# Patient Record
Sex: FEMALE | Race: WHITE
[De-identification: names, ages, dates, MRNs, and addresses within clinical notes are randomized per-mention and may not be internally consistent; named-entity substitution may affect disease eponyms.]

---

## 2019-09-14 ENCOUNTER — HOSPITAL ENCOUNTER (INPATIENT)
Dept: HOSPITAL 1 - ED | Age: 69
LOS: 3 days | Discharge: LEFT BEFORE DISCHARGE | DRG: 393 | End: 2019-09-17
Attending: INTERNAL MEDICINE | Admitting: INTERNAL MEDICINE
Payer: COMMERCIAL

## 2019-09-14 VITALS — BODY MASS INDEX: 27.48 KG/M2 | WEIGHT: 140 LBS | HEIGHT: 60 IN

## 2019-09-14 VITALS — DIASTOLIC BLOOD PRESSURE: 54 MMHG | SYSTOLIC BLOOD PRESSURE: 98 MMHG

## 2019-09-14 DIAGNOSIS — K28.4: ICD-10-CM
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
## 2019-09-14 DIAGNOSIS — K95.89: Primary | ICD-10-CM

## 2019-09-14 DIAGNOSIS — Z79.01: ICD-10-CM

## 2019-09-14 DIAGNOSIS — R57.8: ICD-10-CM
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
## 2019-09-14 DIAGNOSIS — I10: ICD-10-CM

## 2019-09-14 DIAGNOSIS — I95.89: ICD-10-CM

## 2019-09-14 DIAGNOSIS — D68.32: ICD-10-CM

## 2019-09-14 DIAGNOSIS — I11.9: ICD-10-CM

## 2019-09-14 DIAGNOSIS — D62: ICD-10-CM

## 2019-09-14 DIAGNOSIS — I25.10: ICD-10-CM

## 2019-09-14 DIAGNOSIS — I48.91: ICD-10-CM

## 2019-09-14 DIAGNOSIS — Z98.84: ICD-10-CM

## 2019-09-14 LAB
ALBUMIN SERPL-MCNC: 2.8 G/DL (ref 3.4–5)
ALP SERPL-CCNC: 61 U/L (ref 46–116)
ALT SERPL-CCNC: 25 U/L (ref 14–59)
AST SERPL-CCNC: 17 U/L (ref 15–37)
BASOPHILS NFR BLD: 0.2 % (ref 0–2)
BILIRUB SERPL-MCNC: 0.6 MG/DL (ref 0.2–1)
BUN SERPL-MCNC: 33 MG/DL (ref 7–18)
CALCIUM SERPL-MCNC: 7.5 MG/DL (ref 8.5–10.1)
CHLORIDE SERPL-SCNC: 113 MMOL/L (ref 98–107)
CHOLEST SERPL-MCNC: 111 MG/DL (ref ?–200)
CO2 SERPL-SCNC: 24.5 MMOL/L (ref 21–32)
CREAT SERPL-MCNC: 0.8 MG/DL (ref 0.6–1)
ERYTHROCYTE [DISTWIDTH] IN BLOOD BY AUTOMATED COUNT: 15.1 % (ref 11.5–14.5)
GFR SERPLBLD BASED ON 1.73 SQ M-ARVRAT: (no result) ML/MIN
GLUCOSE SERPL-MCNC: 160 MG/DL (ref 74–106)
HDLC SERPL-MCNC: 36 MG/DL (ref 40–60)
MAGNESIUM SERPL-MCNC: 1.9 MG/DL (ref 1.8–2.4)
MICROSCOPIC UR-IMP: YES
PHOSPHATE SERPL-MCNC: 4.1 MG/DL (ref 2.5–4.9)
PLATELET # BLD: 207 X10^3MCL (ref 130–400)
POTASSIUM SERPL-SCNC: 4.7 MMOL/L (ref 3.5–5.1)
PROT SERPL-MCNC: 5.7 G/DL (ref 6.4–8.2)
RBC # UR STRIP.AUTO: (no result) /UL
SODIUM SERPL-SCNC: 147 MMOL/L (ref 136–145)
UA SPECIFIC GRAVITY: 1.02 (ref 1–1.03)

## 2019-09-14 PROCEDURE — C9113 INJ PANTOPRAZOLE SODIUM, VIA: HCPCS

## 2019-09-14 PROCEDURE — C9132 KCENTRA, PER I.U.: HCPCS

## 2019-09-14 PROCEDURE — P9016 RBC LEUKOCYTES REDUCED: HCPCS

## 2019-09-14 PROCEDURE — G0378 HOSPITAL OBSERVATION PER HR: HCPCS

## 2019-09-14 NOTE — NUR
Received pt alert and oriented x4. No distress noted. Kept clean and dry. All
needs attended and anticipated. Seen and examined by Dr. Oliva with new
orders. New orders noted and carried out. Admission care rendered,pt
cooperative and very polite at this time. Pt still ongoing NS 150ml/hr and
Du synephrine 50mcg/min at this time. All meds given as ordered. Will
continue to monitor. Patient tolerated procedure well.

## 2019-09-14 NOTE — NUR
DR BARTON AT BEDSIDE DICUSSING PLAN OF CARE. PT AWARE SHE WILL BE HOPSITALIZED
TO STABLIZE HER BLOOD PRESSURE AND BLEEDING.

## 2019-09-14 NOTE — NUR
INFUSING PHENYLEPHRINE DRIP AT THIS TIME. INFUSING AT 50MCG/MIN
 BP:77/54  MAP:61. WILL CONT TO MONITOR.

## 2019-09-14 NOTE — NUR
PHENYLEPHIRINE TITRATED TO 75MCG/MIN (22.5ML/HR) INFUSING NO PROB TO LAC, NO
INFILTRATION OR PAIN NOTED. BP: 84/51 MAP:63
NS BOLUS INFUSING TO RAC NO INFILTRATION OR PAIN NOTED AT IV SITE. 
 
PT IS AAOX4, NO DISTRESS NOTED, RESP E/U, SKIN INTACT. PT ON FULL CM, NSR, WILL
CONT TO MONITOR.

## 2019-09-14 NOTE — NUR
Spoke to Dr. Whitman and updated him on pt status. MD stated that he will see the
patient in the morning.

## 2019-09-14 NOTE — NUR
PER MEDIC PT WAS AT HOME TAKING A SHOWER THEN SHE USED THE RESTROOM AND NOTICED
"BRIGHT RED BLOOD". PT STS THIS HAPPENED THIS AFTERNOON. PT STS THAT SHE DOES
HAVE A HEADACHE. PT STS THAT SHE IS ALSO HAVING BACK PAIN AND SHOULDER PAIN. PT
DENIES FALLING OR INJURING HER BACK/NECK/SHOULDERS. PT IS ACTIVELY CRYING ABOUT
HER SON LOSING HIS WIFE. PT STS THAT SHE CHANGED HER MEDICATION FROM WARFARIN
TO ELIQUIS FOR ABOUT ONE YEAR. PT STS THAT SHE HAS NEVER HAD BLEEDING BEFORE.
PT STS THAT SHE HAD ABDOMINAL PAIN LAST NIGHT BUT HAD NO BLEEDING. PT IS PALE,
COOL. PT IS ALERT AND ORIENTED, SPEAKING NI CLEAR AND FULL SENTENCES. WILL
CONTINUE TO MONITOR.

## 2019-09-15 VITALS — DIASTOLIC BLOOD PRESSURE: 74 MMHG | SYSTOLIC BLOOD PRESSURE: 114 MMHG

## 2019-09-15 VITALS — SYSTOLIC BLOOD PRESSURE: 97 MMHG | DIASTOLIC BLOOD PRESSURE: 52 MMHG

## 2019-09-15 VITALS — DIASTOLIC BLOOD PRESSURE: 71 MMHG | SYSTOLIC BLOOD PRESSURE: 102 MMHG

## 2019-09-15 VITALS — SYSTOLIC BLOOD PRESSURE: 103 MMHG | DIASTOLIC BLOOD PRESSURE: 75 MMHG

## 2019-09-15 LAB
BASOPHILS NFR BLD: 0 % (ref 0–2)
BASOPHILS NFR BLD: 0 % (ref 0–2)
BASOPHILS NFR BLD: 1.1 % (ref 0–2)
BASOPHILS NFR BLD: 1.1 % (ref 0–2)
BUN SERPL-MCNC: 34 MG/DL (ref 7–18)
CALCIUM SERPL-MCNC: 7.1 MG/DL (ref 8.5–10.1)
CHLORIDE SERPL-SCNC: 115 MMOL/L (ref 98–107)
CO2 SERPL-SCNC: 22.1 MMOL/L (ref 21–32)
CREAT SERPL-MCNC: 0.7 MG/DL (ref 0.6–1)
ERYTHROCYTE [DISTWIDTH] IN BLOOD BY AUTOMATED COUNT: 15.3 % (ref 11.5–14.5)
ERYTHROCYTE [DISTWIDTH] IN BLOOD BY AUTOMATED COUNT: 15.3 % (ref 11.5–14.5)
ERYTHROCYTE [DISTWIDTH] IN BLOOD BY AUTOMATED COUNT: 15.4 % (ref 11.5–14.5)
ERYTHROCYTE [DISTWIDTH] IN BLOOD BY AUTOMATED COUNT: 15.5 % (ref 11.5–14.5)
GFR SERPLBLD BASED ON 1.73 SQ M-ARVRAT: (no result) ML/MIN
GLUCOSE SERPL-MCNC: 105 MG/DL (ref 74–106)
MONOCYTES NFR BLD: 2 % (ref 0–7)
MONOCYTES NFR BLD: 4 % (ref 0–7)
NEUTS BAND NFR BLD: 0 % (ref 0–10)
NEUTS BAND NFR BLD: 2 % (ref 0–10)
NEUTS SEG NFR BLD MANUAL: 63 % (ref 37–75)
NEUTS SEG NFR BLD MANUAL: 69 % (ref 37–75)
PLAT MORPH BLD: (no result)
PLAT MORPH BLD: (no result)
PLATELET # BLD: 151 X10^3MCL (ref 130–400)
PLATELET # BLD: 161 X10^3MCL (ref 130–400)
PLATELET # BLD: 192 X10^3MCL (ref 130–400)
PLATELET # BLD: 200 X10^3MCL (ref 130–400)
POTASSIUM SERPL-SCNC: 4.4 MMOL/L (ref 3.5–5.1)
RBC MORPH BLD: NORMAL
SODIUM SERPL-SCNC: 146 MMOL/L (ref 136–145)

## 2019-09-15 PROCEDURE — 0DB68ZX EXCISION OF STOMACH, VIA NATURAL OR ARTIFICIAL OPENING ENDOSCOPIC, DIAGNOSTIC: ICD-10-PCS | Performed by: INTERNAL MEDICINE

## 2019-09-15 PROCEDURE — 30233N1 TRANSFUSION OF NONAUTOLOGOUS RED BLOOD CELLS INTO PERIPHERAL VEIN, PERCUTANEOUS APPROACH: ICD-10-PCS | Performed by: INTERNAL MEDICINE

## 2019-09-15 PROCEDURE — 0W3P8ZZ CONTROL BLEEDING IN GASTROINTESTINAL TRACT, VIA NATURAL OR ARTIFICIAL OPENING ENDOSCOPIC: ICD-10-PCS | Performed by: INTERNAL MEDICINE

## 2019-09-15 NOTE — NUR
DR WILLIS UPDATING PTS  OF FINDINGS OF PROCEDURE AT THIS TIME. PT AND
FAMILY UPDATED ON POC. ALL QUESTIONS AND CONCERNS ADDRESSED. WILL CONT TO
MONITOR

## 2019-09-15 NOTE — NUR
RECEIVED REPORT FROM SHANTI MCCLELLAN. PT IS ALERT AND ORIENTED X4. LETHARGIC. PUPILS
REACTIVE TO LIGHT. PT IS BREATHING E.U ON RA. LUNG SOUNDS CLEAR TO BILATERAL
UPPER LOBES, DIMINISHED TO BILATERAL LOWER LOBES. S1 S2 HEART SOUNDS
AUSCULTATED. IN STABLE A FIB. PULSES MODERATE X4. CAP REFILL <3 SECS X4. SKIN
IS WARM AND PALE. PERIPHERAL IV TO RIGHT AND LEFT AC PATENT, DRESSING CDI.
PROTONIX INFUSING AT 10 ML/HR AND D5 NS INFUSING  ML/HR. ABD IS SOFT AND
ROUNDED WITH ACTIVE BOWEL SOUNDS X4Q. PT VOIDS FREELY USING BEDSIDE COMMODE.
SKIN INTACT. ALL QUESTIONS AND CONCERNS ANSWERED.

## 2019-09-15 NOTE — NUR
RC'D PT RESTING IN BED WITH NO APPARENT SIGNS OF DISTYRESS. PT ALERT/AWAKE. PT
A/A/O/X4, SPEECH CLEAR AND APPROPRIATE. PT DENIES DIZZINESS/HA. PT ABLE TO
FOLLOW VERBAL COMMANDS AND ABLE TO MAKE NEEDS KNOWN. PUPILS 3MM AND BRISK
BILAT. PERRLA. NO FACIAL DROOP NOTED. EENT FREE OF DRAINAGE. RESP E/U. LUNGS
CTA. ON RA, PT DENIES SOB. SPO2 96%, NO RESP DISTRESS NOTED. AFIB ON CARDIAC
MONITOR. PT DENIES CP. ALEX-SYNEPHRINE INFUSING AT 100MCG/MIN. PALP PULSES, NO
EDEMA NOTED. SKIN WARM TO TOUCH AND CONSISTENT WITH ETHNICITY. CAP REFILL <3.
D5NS INFUSING @100. AMBULATORY. PT NPO AT THIS TIME. ABDOMEN SOFT AND
NONTENDER. ACTIVE BS. DENIES N/V. NO BM AT THIS TIME. PT VOIDS FREELY, DENIES
BURNING AT THIS TIME. SKIN W/D/I. IV TO RAC/LAC INTACT AND PATENT, DRESSING
CDI. BED IN LOWEST POSIITON. WILL CONT TO MONITOR

## 2019-09-15 NOTE — NUR
NS BOLUS INITIATED AT THIS TIME PER MD ORDER. CURRENT VS AS FOLLOWED :
BP 99/73 (82), HR99, R24, SPO2 97%. WILL CONT TO MONITOR

## 2019-09-15 NOTE — NUR
DR SEXTON AT BEDSIDE TO ASSESS PATIENT. PATIENT UPDATE AND POC DISCUSSED
BEDSIDE WITH PATIENT AND HER . ALL QUESTIONS AND CONCERNS ADDRESSED.
NEW ORDER RECEIVED FOR 1 LITER NS BOLUS. WILL CARRY OUT ORDER.

## 2019-09-15 NOTE — NUR
BLOOD TRANSFUSION COMPLETE AT THIS TIME. PT ASYMPTOMATIC. VSS. FAMILY PRESENT
AT BEDSIDE. WILL CONT TO MONITOR

## 2019-09-15 NOTE — NUR
PT COMPLAINING OF PAIN TO HER THROAT, AND REQUESTING MEDICATIONS. PRN MORPHINE
PROVIDED. EDUCATED ON SIDE EFFECTS. CALL LIGHT WITHIN REACH. WILL CONTINUE TO
MONITOR.

## 2019-09-15 NOTE — NUR
PROCEDURE COMPLETE AT THIS TIME. VSS. PT ON 2L O2 VIA NC, SPO2 100%. RESP E/U.
NO APPARENT S/S OF DISTRESS. WILL CONT TO MONITOR

## 2019-09-16 VITALS — SYSTOLIC BLOOD PRESSURE: 91 MMHG | DIASTOLIC BLOOD PRESSURE: 64 MMHG

## 2019-09-16 VITALS — DIASTOLIC BLOOD PRESSURE: 53 MMHG | SYSTOLIC BLOOD PRESSURE: 93 MMHG

## 2019-09-16 VITALS — SYSTOLIC BLOOD PRESSURE: 117 MMHG | DIASTOLIC BLOOD PRESSURE: 66 MMHG

## 2019-09-16 VITALS — SYSTOLIC BLOOD PRESSURE: 103 MMHG | DIASTOLIC BLOOD PRESSURE: 68 MMHG

## 2019-09-16 VITALS — SYSTOLIC BLOOD PRESSURE: 106 MMHG | DIASTOLIC BLOOD PRESSURE: 70 MMHG

## 2019-09-16 LAB
BASOPHILS NFR BLD: 0.4 % (ref 0–2)
BUN SERPL-MCNC: 12 MG/DL (ref 7–18)
CALCIUM SERPL-MCNC: 7.3 MG/DL (ref 8.5–10.1)
CHLORIDE SERPL-SCNC: 117 MMOL/L (ref 98–107)
CO2 SERPL-SCNC: 23.8 MMOL/L (ref 21–32)
CREAT SERPL-MCNC: 0.6 MG/DL (ref 0.6–1)
ERYTHROCYTE [DISTWIDTH] IN BLOOD BY AUTOMATED COUNT: 15.4 % (ref 11.5–14.5)
GFR SERPLBLD BASED ON 1.73 SQ M-ARVRAT: (no result) ML/MIN
GLUCOSE SERPL-MCNC: 78 MG/DL (ref 74–106)
MAGNESIUM SERPL-MCNC: 2.7 MG/DL (ref 1.8–2.4)
OVALOCYTES BLD QL SMEAR: (no result)
PHOSPHATE SERPL-MCNC: 2.4 MG/DL (ref 2.5–4.9)
PLATELET # BLD: 124 X10^3MCL (ref 130–400)
POTASSIUM SERPL-SCNC: 3.8 MMOL/L (ref 3.5–5.1)
RBC MORPH BLD: (no result)
SODIUM SERPL-SCNC: 148 MMOL/L (ref 136–145)

## 2019-09-16 NOTE — NUR
PT TRANSFERRING TO [facility], PT STABLE AT THIS TIME. REPORT GIVEN TO CHARGE RN.

## 2019-09-16 NOTE — NUR
1. Recommend continuing full liquid diet. Ensure Enlive will be given in
between meals instead of with meals.
Discussed recommendations with Dr. Whitman.

## 2019-09-16 NOTE — NUR
PT RESTING IN BED, NO RESPIRATORY DISTRESS NOTED, DENIES PAIN, DENIES NAUSEA,
CALL LIGHT WITHIN REACH.

## 2019-09-16 NOTE — NUR
Initial Nutrition Assessment: IC03/A DARIN SALINAS IA HR
 
Dx: Rectal bleeding
PMHx: AFIB, CAD, HTN
PSHx: none
Labs: NA 148H, P 2.4L, HGB 8.5L
Meds: Ativan, cephulac, D 5%, morphine
Diet: Clear liquid
PO Intake: (9/15) dinner (clear liquid) 60%
Ht: 152.4 cm (60")   Wt: 63.5 kg (140#)  BMI: 27.3 kg/m2   Bed scale: 63.5 kg
IBW: 100# (45 kg) %IBW: 140  UBW: unable to access
Age: 69/F
Food Allergies: NKFA
Skin: intact  [place]: 23
Edema: none
GI: dark tarry liquid stool  Last BM: 9/16
 
Per H&P, Pt is a 68 YO female h/o AFIB was on Coumdin and recently switched to
Eliquis. She presented to the ED with rectal bleedings ongoing throughout the
day.
 
RDN Visit (9/16): Per ELEUTERIO Kenney, patient's diet has been changed to full
liquid by Dr. Whitman. Patient is drinking clear liquids. Dr. Whitman recommended
small frequent feedings due to history of previous GI surgeries.
 
Problem with:  N/V/D/C: none at this time per RN, liquid stools d/t
medications per Dr. Whitman
Problems with: Chewing/Swallowing: no
Current appetite: unable to access
Recent wt change: unable to access  %wt change: n/a
Vitamin/Supplement use: unable to access
Special diet at home: unable to access
Physical activity: unable to access
Nutrition education given: not possible at this time d/t patient condition
Food-drug interactions: none Education given: n/a
 
Estimated Nutritional Needs Based on current body weight 63.5 kg
 Energy: 2403-9257 kcal/d (25-30 kcal/kg)
 Protein: 76-89 g/d (1.2-1.4 g/kg) - preserve LBM, rectal bleeding
 Fluid: 4971-8381 ml/d (1 ml/kcal) or per doctor
 
Nutrition Diagnosis
1. Increased nutrient needs related to rectal bleeding as evidenced by
estimated calorie and protein needs.
 
Intervention
1. Recommend continuing full liquid diet. Ensure Enlive will be given in
between meals instead of with meals.
Discussed recommendations with Dr. Whitman.
 
Monitor/Evaluate
 Goal: PO intake at least 75% of estimated needs
 Monitor: PO intake, Labs, GI function
 F/U in 2-3 days as high risk 9/18-19

## 2019-09-16 NOTE — NUR
INITIATED BLOOD TRANSFUSION PER DR ORDER. TEMP 98.2.PULSE 98, BP: 89/59, RR:
17, O2: 98%. WILL CONTINUE TO MONITOR FOR ADVERSE EFFECTS.

## 2019-09-16 NOTE — NUR
ASSISTED PT TO BATHROOM AND BACK TO BED, ASSISTED PT TO CHANGE GOWN, NO
RESPRIATORY DISTRESS NOTED,  AT BEDSIDE, CALL LIGHT WITHIN REACH.

## 2019-09-16 NOTE — NUR
PT C/O NAUSEA, ADMINISTERED 12.5 MG PHENERGAN. PT C/O SEVERE PAIN TO IV SITE,
IV REMOVED. PT HAS NO IV ACCESS AT THIS TIME. CATHETER FOR RAC IV INTACT,
GAUZE AND TAPE PUT OVER SITE. PT REPORTS ALLEVIATION OF PAIN. WILL CONTINUE TO
MONITOR.

## 2019-09-16 NOTE — NUR
PT RECEIVED A/O X4, ABLE TO MAKE NEEDS KNOWN. TELE #13, AFIB, DENIES ANY
CP/PRESSURE. PULSES PALPABLE, NO EDEMA PRESENT. BREATHING IS EVEN AND
UNLABORED, NO RESP DISTRESS NOTED. ABD SOFT AND NONDISTENDED, DENIES N/V.
VOIDS FREELY, BRP. AMBULATORY WITH STEADY GAIT. SKIN IS WARM AND DRY, INTACT.
PT DENIES ANY PAIN AT THIS TIME. PT ADMITTED FOR RECTAL BLEED, NO ACTIVE
BLEEDING OBSERVED. IVF INFUSING WELL TO RENE, PATENT AND INTACT, SITE WNL. NO
ACUTE DISTRESS NOTED. BED IN LOWEST SETTING, SIDE RAILS UP X2, CALL LIGHT
WITHIN REACH. WILL CONT TO MONITOR.

## 2019-09-16 NOTE — NUR
BLOOD TRANSFUSION COMPLETED. VITAL SIGNS INCLUDE TEMP: 98.4, PULSE: 70, BP:
95/52, RR: 19, O2: 98%. NO ADVERSE EFFECTS NOTED.

## 2019-09-16 NOTE — NUR
RECEIVED PT FROM ICU, AMBULATED FROM WC TO BED WITH MINIMAL ASSISTANCE,
COMPLAINING OF NAUSEA, ZOFRAN IVP GIVEN IN ICU BEFORE PT WAS TRANSPORTED, NO
RESPRIATORY DISTRESS NOTED, LUNG SOUNDS CTA, TOLERATING RA, A+OX4, PULSES
MODERATE AND EQUAL [place], NO EDEMA NOTED, A FIB, SKIN INTACT, TOLERATING CLEAR
LIQUID DIET, BOWEL SOUNDS ACTIVE, IV IN RENE WITH D5NS @ 40 ML/HR, SITE WNL,
VOIDING FREELY, CALM AND COOPERATIVE AT THIS TIME, VS STABLE, CALL LIGHT
WITHIN REACH.

## 2019-09-16 NOTE — NUR
RAC IV REMOVED AT THIS TIME. CATHETER PARTIALLY OUT OF SKIN, KINKED. GAUZE AND
TAPE IN PLACE OVER SITE, CATHETER OF IV INTACT. NO S/S LOCAL INFECTION AT IV
REMOVAL SITE.

## 2019-09-16 NOTE — NUR
PT RESTING IN BED, NO RESPIRATORY DISTRESS NOTED, DENIES PAIN, DENIES NAUSEA.
PT STATES SHE HAS HAD 3 FORMED BMS TODAY WITH DARK RED BLOOD. CALL LIGHT
WITHIN REACH.

## 2019-09-17 VITALS — SYSTOLIC BLOOD PRESSURE: 94 MMHG | DIASTOLIC BLOOD PRESSURE: 62 MMHG

## 2019-09-17 VITALS — DIASTOLIC BLOOD PRESSURE: 72 MMHG | SYSTOLIC BLOOD PRESSURE: 106 MMHG

## 2019-09-17 VITALS — SYSTOLIC BLOOD PRESSURE: 90 MMHG | DIASTOLIC BLOOD PRESSURE: 56 MMHG

## 2019-09-17 VITALS — DIASTOLIC BLOOD PRESSURE: 71 MMHG | SYSTOLIC BLOOD PRESSURE: 111 MMHG

## 2019-09-17 VITALS — DIASTOLIC BLOOD PRESSURE: 67 MMHG | SYSTOLIC BLOOD PRESSURE: 91 MMHG

## 2019-09-17 VITALS — SYSTOLIC BLOOD PRESSURE: 135 MMHG | DIASTOLIC BLOOD PRESSURE: 72 MMHG

## 2019-09-17 VITALS — SYSTOLIC BLOOD PRESSURE: 94 MMHG | DIASTOLIC BLOOD PRESSURE: 63 MMHG

## 2019-09-17 LAB
BASOPHILS NFR BLD: 0.3 % (ref 0–2)
BUN SERPL-MCNC: 4 MG/DL (ref 7–18)
CALCIUM SERPL-MCNC: 7.8 MG/DL (ref 8.5–10.1)
CHLORIDE SERPL-SCNC: 112 MMOL/L (ref 98–107)
CO2 SERPL-SCNC: 30.2 MMOL/L (ref 21–32)
CREAT SERPL-MCNC: 0.6 MG/DL (ref 0.6–1)
ERYTHROCYTE [DISTWIDTH] IN BLOOD BY AUTOMATED COUNT: 15.4 % (ref 11.5–14.5)
GFR SERPLBLD BASED ON 1.73 SQ M-ARVRAT: (no result) ML/MIN
GLUCOSE SERPL-MCNC: 83 MG/DL (ref 74–106)
PLATELET # BLD: 132 X10^3MCL (ref 130–400)
POTASSIUM SERPL-SCNC: 4.1 MMOL/L (ref 3.5–5.1)
SODIUM SERPL-SCNC: 146 MMOL/L (ref 136–145)

## 2019-09-17 NOTE — NUR
PT NOTED TO HAVE ANXIETY. PT CRYING STATES SHE HAS HISTORY OF DEPRESSION AND
HAS BEEN TAKING ZOLOFT FOR YEARS. ATIVAN ADMINISTERED AS ORDERED PRN. WILL
CONTINUE TO MONITOR.

## 2019-09-17 NOTE — NUR
PT IS  BED A RESTING IN SEMI FOWLERS POSITION. PT IS A/O X4, SPEECH IS
CLEAR AND FOLLOWS COMMANDS. CHEST RISE AND FALL EQUAL AND UNLABORED. LS CLEAR
BILATERAL . PT ON TELE 13 IN A-FIB VS STABLE AT THIS TIME. NO SIGN OF ACUTE
DISTRESS. BED  LEFT IN THE LOWEST POSITION AND CALL LIGHT WITH IN REACH.

## 2019-09-17 NOTE — NUR
PT SLEPT WELL THROUGHOUT THE EVENING. BREATHING IS EVEN AND UNLABORED, NO RESP
DISTRESS NOTED. NO ACUTE CHANGES ENCOUNTERED DURING SHIFT. ALL NEEDS MET AND
ANTICIPATED. PT COMPLIANT WITH NURSING CARE. IVF INFUSING WELL, SITE WNL. CALL
LIGHT WITHIN REACH. WILL ENDORSE CARE TO AM NURSE.

## 2019-09-17 NOTE — NUR
IV TO RIGHT UPPER ARM LEAKING. IV REMOVED WITH CATHETER INTACT. NEW IV STARTED
TO RIGHT HAND. 24G, SALINE LOCKED. PT TOLERATED WELL.

## 2019-09-17 NOTE — NUR
RECEIVED PT FROM NIGHT SHIFT. PT AWAKE, ALERT. A/OX4. PT ON TELE 13, DENIES
CHEST PAIN. PT ON ROOM AIR WITH NO RESP DISTRESS NOTED. IV ACCESS RENE, CDI
INFUSING D5NS AT 40ML/HR. PERIPHERAL PULSES PALPABLE, NO EDEMA NOTED. ACTIVE
BS NOTED. PT DENIES BM AT THIS TIME, UNABLE TO ASSESS STOOL. PT IS AMBULATORY
WITH BRP. PT DENIES ANY PAIN AT THIS TIME. SAFETY MEASURES IN PLACE, BED LOW
AND LOCKED. CALL LIGHT WITHIN REACH.

## 2019-09-17 NOTE — NUR
PT RESTING IN BED WITH EYES CLOSED, BUT IS EASILY AROUSABLE. BREATHING IS EVEN
AND UNLABORED, NO RESP DISTRESS NOTED. IVF INFUSING WELL, SITE WNL. NO ACUTE
DISTRESS NOTED. CALL LIGHT WITHIN REACH. WILL CONT TO MONITOR.

## 2019-09-17 NOTE — NUR
PT REPORTS RELIEF AFTER ADMINISTRATION OF NORCO FOR BACK PAIN. FAMILY AT
BEDSIDE. PT STABLE AT THIS TIME. ALL NEEDS MET THROUGHOUT SHIFT. WILL CONTINUE
TO MONITOR AND ENDORSE CARE TO NIGHT SHIFT.

## 2019-09-17 NOTE — NUR
PT REMOVED AND THROW CARDIAC MONITOR ON THE FLOOR, ATTEMPTED TO PUT CARDIAC
MONITOR BACK ON BUT PT REFUSED TO HAVE IT AND STATED THAT SHE DOESN'T NEED IT
AT ALL, EXPLAINED THE RISK AND BENEFIT BUT PT STILL REFUSED, PT ALSO REFUSED
TO SIGN THE REFUSAL TREATMENT FORM, PT IS AAO X 4 AND VERY VERBALLY
RESPONSIVE, DR DAVIS MADE AWARE AND UPDATES GIVEN.

## 2019-09-17 NOTE — NUR
PT FOUND SITTING IN CHAIR AND WHEN ASKED IF SHE IS OKAY, SHE STS "I'M WAITING
FOR MY RIDE. WHEN ASKED WHO IS PICKING HER UP SHE STS "IT NONE OF YOUR
BUSINESS," AND THAT SHE WANTS TO LEAVE. I EXPLAINED TO PT IF SHE LEAVES/GOES
AMA THAT SHE IS STILL IN CRITCAL CONDITION AND ALL RISK EXPLAINED TO HER
INCLUDING DEATH. PT STS "I DONT CARE."

## 2019-09-17 NOTE — NUR
FAMILY AT BEDSIDE. PT RESTING, REPORTS SOME RELIEF FROM ANXIETY AFTER ATIVAN
ADMINISTRATION. WILL CONTINUE TO MONITOR.

## 2019-09-18 NOTE — NUR
PT SIGNED OUT AMA, ALL RISKED EXPLAINED TO PT INCLUDING DEATH. PT STILL SIGNED
AMA. PT IS A/O X4, SPEECH IS CLEAR AND IS OF SOUND MIND TO SIGN OUT AMA. PT'S
EX- HERE TO PICK HER UP. PT LEFT MST WITHOUT INCIDENT.
DR DAVIS MADE AWARE OF PT AMAing OUT OF HOSP.